# Patient Record
Sex: MALE | Race: OTHER
[De-identification: names, ages, dates, MRNs, and addresses within clinical notes are randomized per-mention and may not be internally consistent; named-entity substitution may affect disease eponyms.]

---

## 2020-11-06 ENCOUNTER — HOSPITAL ENCOUNTER (EMERGENCY)
Dept: HOSPITAL 62 - ER | Age: 20
Discharge: HOME | End: 2020-11-06
Payer: COMMERCIAL

## 2020-11-06 VITALS — SYSTOLIC BLOOD PRESSURE: 142 MMHG | DIASTOLIC BLOOD PRESSURE: 70 MMHG
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
## 2020-11-06 DIAGNOSIS — S69.91XA: ICD-10-CM
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
## 2020-11-06 DIAGNOSIS — S49.91XA: Primary | ICD-10-CM

## 2020-11-06 PROCEDURE — 99284 EMERGENCY DEPT VISIT MOD MDM: CPT

## 2020-11-06 NOTE — ER DOCUMENT REPORT
ED General





- General


Chief Complaint: Arm Injury


Stated Complaint: FALL/RIGHT SHOULDER PAIN


Time Seen by Provider: 11/06/20 16:31


Mode of Arrival: Ambulatory


Information source: Patient


Notes: 





20-year-old -American male coming in today with the right shoulder and 

right hand pain.  He was standing up on a desk yesterday cleaning the top of a 

wall locker unit.  When he fell his arm got stuck above his head and he felt a 

pulling and a popping sensation in his right shoulder.  Also swelling in his 

right dorsal hand.  Does not have any other injuries.





- Related Data


Allergies/Adverse Reactions: 


                                        





No Known Allergies Allergy (Verified 11/06/20 16:31)


   








Home Medications: pulses intact, cap refill <3 seconds, sensations intact





Past Medical History





- Social History


Smoking Status: Current Every Day Smoker


Chew tobacco use (# tins/day): No


Frequency of alcohol use: Occasional


Drug Abuse: None


Family History: None





Review of Systems





- Review of Systems


Notes: 





Constitutional:  No fevers. No chills.





EENT: No eye redness. No eye pain. No ear pain. No sore throat.





Cardiovascular:  No chest pain. No palpitations.





Respiratory: No cough. No shortness of breath. No respiratory distress.





Gastrointestinal: No abdominal pain. No nausea, vomiting, or diarrhea.





Genitourinary: Atraumatic. No lesions. No pain. No discharge.





Musculoskeletal: Pain in right shoulder, pain in right hand





Skin: No rash or lesions.





Lymphatic: No swollen lymph nodes.





Neurologic: No headache. No syncope.





Psychiatric: No suicidal or homicidal ideation.





Physical Exam





- Vital signs


Vitals: 


                                        











Temp Pulse Resp BP Pulse Ox


 


 98.8 F   70   16   142/70 H  100 


 


 11/06/20 15:53  11/06/20 15:53  11/06/20 15:53  11/06/20 15:53  11/06/20 15:53














- Notes


Notes: 





General: Well-developed, well-nourished. In no acute distress. Non-toxic 

appearing.





Cardiac: Well-perfused. Regular rate and rhythm. No murmurs, rubs, or gallops. 





Pulmonary: No respiratory distress. No cyanosis. Bilateral lung fiels are clear 

to auscultation.





Abdominal: Non-distended. Non-rigid. Bowels sounds are present in all four 

quadrants. No guarding or rebound.





HEENT: Head is atraumatic. Conjunctivae not reddened. No tearing. PERRL. EOMI. 

Orbits atraumatic. No periorbital swelling or erythema. Oropharynx is without 

erythema, swelling, or exudates.





Neck: Supple. No adenopathy. No meningismus.





Dermatologic: Warm with good turgor. No rash. Atraumatic.





Chest: Atraumatic. No chest wall tenderness to palpation.





Musculoskeletal: Right upper extremity is examined.  Diffuse proximal humeral 

tenderness right side.  No deformities.  Good range of motion.  No crepitus 

appreciated.  Tenderness to palpation of the right dorsal hand with mild soft 

tissue swelling.  No bony deformity.  Good range of motion of all digits.  Good 

flexion and extension of the wrist good pronation supination of the forearm.  

Distal neuro vascular exam is intact





Genitourinary: Examination deferred





Neurologic: No gross neurologic deficits.





Psychiatric: Normal mood. 











Course





- Re-evaluation


Re-evalutation: 





11/06/20 17:45


X-rays of the right shoulder and right hand are negative.  Given the history 

patient most likely has some soft tissue injury, possibly rotator cuff injury to

the right shoulder.  We will keep him in a sling.  Put him on naproxen twice a 

day and refer to Ortho.





- Vital Signs


Vital signs: 


                                        











Temp Pulse Resp BP Pulse Ox


 


 98.8 F   70   16   142/70 H  100 


 


 11/06/20 15:53  11/06/20 15:53  11/06/20 15:53  11/06/20 15:53  11/06/20 15:53














- Diagnostic Test


Radiology reviewed: Reports reviewed





Discharge





- Discharge


Clinical Impression: 


Right shoulder injury


Qualifiers:


 Encounter type: initial encounter Qualified Code(s): S49.91XA - Unspecified 

injury of right shoulder and upper arm, initial encounter





Hand injury


Qualifiers:


 Encounter type: initial encounter Laterality: right Qualified Code(s): S69.91XA

- Unspecified injury of right wrist, hand and finger(s), initial encounter





Condition: Good


Disposition: HOME, SELF-CARE


Instructions:  Shoulder Injury ([facility])


Additional Instructions: 


Take your arm out of the sling multiple times a day and put it through a full 

range of motion so that your shoulder does not freeze up or lock in 1 position. 

Naproxen sodium twice a day as needed for pain.  You will likely need to see an 

orthopedist to have further evaluation of the soft tissues of your right 

shoulder.  These do not appear on regular x-rays.


Prescriptions: 


Naproxen 500 mg PO BID 10 Days #20 tablet


Referrals: 


GOLDY SCHMITT,  [ACTIVE STAFF] - Follow up as needed

## 2020-11-06 NOTE — RADIOLOGY REPORT (SQ)
EXAM DESCRIPTION:  HAND RIGHT 3 VIEWS



IMAGES COMPLETED DATE/TIME:  11/6/2020 4:52 pm



REASON FOR STUDY:  injury



COMPARISON:  None.



EXAM PARAMETERS:  NUMBER OF VIEWS: Three views.

TECHNIQUE: AP, lateral and oblique  radiographic images acquired of the right hand.

LIMITATIONS: None.



FINDINGS:  MINERALIZATION: Normal.

BONES: No acute fracture or dislocation.  No worrisome bone lesions.

JOINTS: No effusions.

SOFT TISSUES: No soft tissue swelling.  No foreign body.

OTHER: No other significant finding.



IMPRESSION:  NEGATIVE STUDY OF THE RIGHT HAND. NO RADIOGRAPHIC EVIDENCE OF ACUTE INJURY.



TECHNICAL DOCUMENTATION:  JOB ID:  [id number]

 2011 [employer]- All Rights Reserved



Reading location - IP/workstation name: [email or web address]

## 2020-11-06 NOTE — RADIOLOGY REPORT (SQ)
EXAM DESCRIPTION:  SHOULDER RIGHT 2 OR MORE VIEWS



IMAGES COMPLETED DATE/TIME:  11/6/2020 4:52 pm



REASON FOR STUDY:  injury



COMPARISON:  None.



NUMBER OF VIEWS:  Three views.



TECHNIQUE:  Internal rotation, external rotation, and Y view images acquired of the right shoulder.



LIMITATIONS:  None.



FINDINGS:  MINERALIZATION: Normal.

BONES: No acute fracture.  No worrisome bone lesions.

JOINTS: No dislocation.

VISUALIZED LUNGS AND RIBS: No pneumothorax.  No rib fracture.

SOFT TISSUES: No radiopaque foreign body.

OTHER: No other significant finding.



IMPRESSION:  NEGATIVE STUDY OF THE RIGHT SHOULDER. NO RADIOGRAPHIC EVIDENCE OF ACUTE INJURY.



TECHNICAL DOCUMENTATION:  JOB ID:  [id number]

 2011 [employer]- All Rights Reserved



Reading location - IP/workstation name: [email or web address]